# Patient Record
Sex: MALE | Race: WHITE | NOT HISPANIC OR LATINO | URBAN - METROPOLITAN AREA
[De-identification: names, ages, dates, MRNs, and addresses within clinical notes are randomized per-mention and may not be internally consistent; named-entity substitution may affect disease eponyms.]

---

## 2019-07-17 ENCOUNTER — HOSPITAL ENCOUNTER (EMERGENCY)
Facility: HOSPITAL | Age: 34
Discharge: HOME | End: 2019-07-17
Attending: EMERGENCY MEDICINE
Payer: MEDICAID

## 2019-07-17 VITALS
HEIGHT: 73 IN | DIASTOLIC BLOOD PRESSURE: 70 MMHG | SYSTOLIC BLOOD PRESSURE: 135 MMHG | TEMPERATURE: 97 F | HEART RATE: 76 BPM | RESPIRATION RATE: 16 BRPM | OXYGEN SATURATION: 100 %

## 2019-07-17 DIAGNOSIS — M54.50 CHRONIC MIDLINE LOW BACK PAIN WITHOUT SCIATICA: Primary | ICD-10-CM

## 2019-07-17 DIAGNOSIS — G89.29 CHRONIC MIDLINE LOW BACK PAIN WITHOUT SCIATICA: Primary | ICD-10-CM

## 2019-07-17 PROCEDURE — 99284 EMERGENCY DEPT VISIT MOD MDM: CPT

## 2019-07-17 RX ORDER — TRAMADOL HYDROCHLORIDE 50 MG/1
50 TABLET ORAL EVERY 6 HOURS PRN
Qty: 12 TABLET | Refills: 0 | Status: ON HOLD | OUTPATIENT
Start: 2019-07-17 | End: 2022-08-17 | Stop reason: HOSPADM

## 2019-07-17 RX ORDER — ORPHENADRINE CITRATE 100 MG/1
100 TABLET, EXTENDED RELEASE ORAL 2 TIMES DAILY
Qty: 12 TABLET | Refills: 0 | Status: ON HOLD | OUTPATIENT
Start: 2019-07-17 | End: 2022-08-17 | Stop reason: HOSPADM

## 2019-07-17 NOTE — ED PROVIDER NOTES
"   History      Chief Complaint   Patient presents with    Back Pain     pt complains of back pain for past 6 months. hx of "C3-C5 fracture in 2005."       Review of patient's allergies indicates:  No Known Allergies     HPI   HPI    7/17/2019, 10:32 AM   History obtained from the patient      History of Present Illness: Mario Echevarria is a 34 y.o. male patient who presents to the Emergency Department for low back pain for months. He just completed round of steroids last week.  He is taking ibuprofen.  Symptoms are constant and moderate in severity.  The patient describes the symptoms as achy.  Denies new injury,  bladder/bowel dysfunction, fever, saddle anesthesia, or focal weakness.   No further complaints or concerns at this time.           PCP: Primary Doctor No       Past Medical History:  History reviewed. No pertinent past medical history.      Past Surgical History:  No past surgical history on file.        Family History:  History reviewed. No pertinent family history.        Social History:  Social History     Tobacco Use    Smoking status: Not on file   Substance and Sexual Activity    Alcohol use: Not on file    Drug use: Not on file    Sexual activity: Not on file       ROS   Review of Systems   Constitutional: Negative for chills and fever.   HENT: Negative for sore throat.    Respiratory: Negative for shortness of breath.    Cardiovascular: Negative for chest pain.   Gastrointestinal: Negative for nausea and vomiting.   Genitourinary: Negative for decreased urine volume, difficulty urinating, dysuria and flank pain.   Musculoskeletal: Positive for back pain. Negative for neck stiffness.   Skin: Negative for rash and wound.   Neurological: Negative for weakness and numbness.   Hematological: Does not bruise/bleed easily.   All other systems reviewed and are negative.    Review of Systems    Physical Exam      Initial Vitals [07/17/19 1000]   BP Pulse Resp Temp SpO2   135/70 76 16 97.4 °F " "(36.3 °C) 100 %      MAP       --         Physical Exam  Vital signs and nursing notes reviewed.  Constitutional: Patient is in NAD. Awake and alert. Well-developed and well-nourished.  Head: Atraumatic. Normocephalic.  Eyes: PERRL. EOM intact. Conjunctivae nl. No scleral icterus.  ENT: Mucous membranes are moist. Oropharynx is clear.  Neck: Supple. No JVD. No lymphadenopathy.  No meningismus.  Nontender midline  Cardiovascular: Regular rate and rhythm. No murmurs, rubs, or gallops. Distal pulses are 2+ and symmetric.  Pulmonary/Chest: No respiratory distress. Clear to auscultation bilaterally. No wheezing, rales, or rhonchi.  Abdominal: Soft. Non-distended. No TTP. No rebound, guarding, or rigidity. Good bowel sounds.  Genitourinary: No CVA tenderness  Musculoskeletal: Moves all extremities. No edema.   Non tender c/t spine.  Lumbar spine with mild bilateral paraspinous ttp, no midline ttp or step.  Skin: Warm and dry.  Neurological: Awake and alert. No acute focal neurological deficits are appreciated.  5/5 x 4 strength.  Strong and equal hand , foot plantar and dorsiflexion.  Psychiatric: Normal affect. Good eye contact. Appropriate in content.      ED Course      Procedures  ED Vital Signs:  Vitals:    07/17/19 1000   BP: 135/70   Pulse: 76   Resp: 16   Temp: 97.4 °F (36.3 °C)   TempSrc: Oral   SpO2: 100%   Height: 6' 1" (1.854 m)                 Imaging Results:  Imaging Results    None            The Emergency Provider reviewed the vital signs and test results, which are outlined above.    ED Discussion         Medication(s) given in the ER:  Medications - No data to display        Follow-up Information     [facility] In 2 days.    Contact information:  [address] [facility] [address] [phone]                       Discharge Medication List as of 7/17/2019 10:32 AM      START taking these medications    Details   orphenadrine (NORFLEX) 100 mg tablet Take 1 tablet (100 mg " total) by mouth 2 (two) times daily., Starting Wed 7/17/2019, Print      traMADol (ULTRAM) 50 mg tablet Take 1 tablet (50 mg total) by mouth every 6 (six) hours as needed for Pain., Starting Wed 7/17/2019, Print                Medical Decision Making      Gave # to [facility] neurosurgery    All findings were reviewed with the patient/family in detail.   All remaining questions and concerns were addressed at that time.  Patient/family has been counseled regarding the need for follow-up as well as the indication to return to the emergency room should new or worrisome developments occur.          MDM               Clinical Impression:        ICD-10-CM ICD-9-CM   1. Chronic midline low back pain without sciatica M54.5 724.2    G89.29 338.29               Margie Ceballos PA-C  07/17/19 1213

## 2022-08-10 ENCOUNTER — HOSPITAL ENCOUNTER (EMERGENCY)
Facility: HOSPITAL | Age: 37
Discharge: PSYCHIATRIC OR SUBSTANCE USE FACILITY | End: 2022-08-10
Attending: EMERGENCY MEDICINE

## 2022-08-10 VITALS
HEIGHT: 72 IN | WEIGHT: 145.5 LBS | TEMPERATURE: 98 F | OXYGEN SATURATION: 98 % | BODY MASS INDEX: 19.71 KG/M2 | RESPIRATION RATE: 16 BRPM | SYSTOLIC BLOOD PRESSURE: 126 MMHG | HEART RATE: 93 BPM | DIASTOLIC BLOOD PRESSURE: 78 MMHG

## 2022-08-10 DIAGNOSIS — R45.851 DEPRESSION WITH SUICIDAL IDEATION: Primary | ICD-10-CM

## 2022-08-10 DIAGNOSIS — F32.A DEPRESSION WITH SUICIDAL IDEATION: Primary | ICD-10-CM

## 2022-08-10 DIAGNOSIS — R45.850 HOMICIDAL BEHAVIOR: ICD-10-CM

## 2022-08-10 LAB
ALBUMIN SERPL BCP-MCNC: 4.3 G/DL (ref 3.5–5.2)
ALP SERPL-CCNC: 74 U/L (ref 55–135)
ALT SERPL W/O P-5'-P-CCNC: 19 U/L (ref 10–44)
AMPHET+METHAMPHET UR QL: ABNORMAL
ANION GAP SERPL CALC-SCNC: 12 MMOL/L (ref 8–16)
APAP SERPL-MCNC: <3 UG/ML (ref 10–20)
AST SERPL-CCNC: 16 U/L (ref 10–40)
BARBITURATES UR QL SCN>200 NG/ML: NEGATIVE
BASOPHILS # BLD AUTO: 0.08 K/UL (ref 0–0.2)
BASOPHILS NFR BLD: 0.8 % (ref 0–1.9)
BENZODIAZ UR QL SCN>200 NG/ML: NEGATIVE
BILIRUB SERPL-MCNC: 0.9 MG/DL (ref 0.1–1)
BILIRUB UR QL STRIP: ABNORMAL
BUN SERPL-MCNC: 13 MG/DL (ref 6–20)
BZE UR QL SCN: NEGATIVE
CALCIUM SERPL-MCNC: 9.6 MG/DL (ref 8.7–10.5)
CANNABINOIDS UR QL SCN: NEGATIVE
CHLORIDE SERPL-SCNC: 105 MMOL/L (ref 95–110)
CLARITY UR: CLEAR
CO2 SERPL-SCNC: 23 MMOL/L (ref 23–29)
COLOR UR: YELLOW
CREAT SERPL-MCNC: 1.3 MG/DL (ref 0.5–1.4)
CREAT UR-MCNC: 288.4 MG/DL (ref 23–375)
DIFFERENTIAL METHOD: ABNORMAL
EOSINOPHIL # BLD AUTO: 0.2 K/UL (ref 0–0.5)
EOSINOPHIL NFR BLD: 1.8 % (ref 0–8)
ERYTHROCYTE [DISTWIDTH] IN BLOOD BY AUTOMATED COUNT: 11.7 % (ref 11.5–14.5)
EST. GFR  (NO RACE VARIABLE): >60 ML/MIN/1.73 M^2
ETHANOL SERPL-MCNC: <10 MG/DL
GLUCOSE SERPL-MCNC: 92 MG/DL (ref 70–110)
GLUCOSE UR QL STRIP: NEGATIVE
HCT VFR BLD AUTO: 41.2 % (ref 40–54)
HGB BLD-MCNC: 14.7 G/DL (ref 14–18)
HGB UR QL STRIP: NEGATIVE
IMM GRANULOCYTES # BLD AUTO: 0.02 K/UL (ref 0–0.04)
IMM GRANULOCYTES NFR BLD AUTO: 0.2 % (ref 0–0.5)
KETONES UR QL STRIP: NEGATIVE
LEUKOCYTE ESTERASE UR QL STRIP: NEGATIVE
LYMPHOCYTES # BLD AUTO: 1.1 K/UL (ref 1–4.8)
LYMPHOCYTES NFR BLD: 11.1 % (ref 18–48)
MCH RBC QN AUTO: 31.3 PG (ref 27–31)
MCHC RBC AUTO-ENTMCNC: 35.7 G/DL (ref 32–36)
MCV RBC AUTO: 88 FL (ref 82–98)
METHADONE UR QL SCN>300 NG/ML: NEGATIVE
MONOCYTES # BLD AUTO: 0.6 K/UL (ref 0.3–1)
MONOCYTES NFR BLD: 6.3 % (ref 4–15)
NEUTROPHILS # BLD AUTO: 7.8 K/UL (ref 1.8–7.7)
NEUTROPHILS NFR BLD: 79.8 % (ref 38–73)
NITRITE UR QL STRIP: NEGATIVE
NRBC BLD-RTO: 0 /100 WBC
OPIATES UR QL SCN: NEGATIVE
PCP UR QL SCN>25 NG/ML: NEGATIVE
PH UR STRIP: 7 [PH] (ref 5–8)
PLATELET # BLD AUTO: 322 K/UL (ref 150–450)
PMV BLD AUTO: 7.9 FL (ref 9.2–12.9)
POTASSIUM SERPL-SCNC: 4 MMOL/L (ref 3.5–5.1)
PROT SERPL-MCNC: 7.7 G/DL (ref 6–8.4)
PROT UR QL STRIP: NEGATIVE
RBC # BLD AUTO: 4.7 M/UL (ref 4.6–6.2)
SARS-COV-2 RDRP RESP QL NAA+PROBE: NEGATIVE
SODIUM SERPL-SCNC: 140 MMOL/L (ref 136–145)
SP GR UR STRIP: 1.02 (ref 1–1.03)
TOXICOLOGY INFORMATION: ABNORMAL
TSH SERPL DL<=0.005 MIU/L-ACNC: 0.82 UIU/ML (ref 0.4–4)
URN SPEC COLLECT METH UR: ABNORMAL
UROBILINOGEN UR STRIP-ACNC: NEGATIVE EU/DL
WBC # BLD AUTO: 9.83 K/UL (ref 3.9–12.7)

## 2022-08-10 PROCEDURE — 80143 DRUG ASSAY ACETAMINOPHEN: CPT | Performed by: EMERGENCY MEDICINE

## 2022-08-10 PROCEDURE — 86803 HEPATITIS C AB TEST: CPT | Performed by: EMERGENCY MEDICINE

## 2022-08-10 PROCEDURE — 87389 HIV-1 AG W/HIV-1&-2 AB AG IA: CPT | Performed by: EMERGENCY MEDICINE

## 2022-08-10 PROCEDURE — 84443 ASSAY THYROID STIM HORMONE: CPT | Performed by: EMERGENCY MEDICINE

## 2022-08-10 PROCEDURE — 96372 THER/PROPH/DIAG INJ SC/IM: CPT | Performed by: EMERGENCY MEDICINE

## 2022-08-10 PROCEDURE — 85025 COMPLETE CBC W/AUTO DIFF WBC: CPT | Performed by: EMERGENCY MEDICINE

## 2022-08-10 PROCEDURE — 63600175 PHARM REV CODE 636 W HCPCS: Performed by: EMERGENCY MEDICINE

## 2022-08-10 PROCEDURE — 36415 COLL VENOUS BLD VENIPUNCTURE: CPT | Performed by: EMERGENCY MEDICINE

## 2022-08-10 PROCEDURE — 99285 EMERGENCY DEPT VISIT HI MDM: CPT | Mod: 25

## 2022-08-10 PROCEDURE — 82077 ASSAY SPEC XCP UR&BREATH IA: CPT | Performed by: EMERGENCY MEDICINE

## 2022-08-10 PROCEDURE — U0002 COVID-19 LAB TEST NON-CDC: HCPCS | Performed by: EMERGENCY MEDICINE

## 2022-08-10 PROCEDURE — 80307 DRUG TEST PRSMV CHEM ANLYZR: CPT | Performed by: EMERGENCY MEDICINE

## 2022-08-10 PROCEDURE — 81003 URINALYSIS AUTO W/O SCOPE: CPT | Mod: 59 | Performed by: EMERGENCY MEDICINE

## 2022-08-10 PROCEDURE — 80053 COMPREHEN METABOLIC PANEL: CPT | Performed by: EMERGENCY MEDICINE

## 2022-08-10 RX ORDER — LORAZEPAM 2 MG/ML
2 INJECTION INTRAMUSCULAR
Status: COMPLETED | OUTPATIENT
Start: 2022-08-10 | End: 2022-08-10

## 2022-08-10 RX ORDER — LORAZEPAM 1 MG/1
2 TABLET ORAL
Status: DISCONTINUED | OUTPATIENT
Start: 2022-08-10 | End: 2022-08-10

## 2022-08-10 RX ADMIN — LORAZEPAM 2 MG: 2 INJECTION INTRAMUSCULAR; INTRAVENOUS at 11:08

## 2022-08-10 NOTE — ED PROVIDER NOTES
Encounter Date: 8/10/2022    SCRIBE #1 NOTE: I, Breaan Wagneremeli, am scribing for, and in the presence of, Jamison Vang MD.       History     Chief Complaint   Patient presents with    Suicidal     Brought in by [place] [employer].  Threat of suicide by .      Time seen by provider: 10:55 AM on 08/10/2022    Mario Echevarria is a 37 y.o. male who presents to the ED via [place] police after threat of suicide. Per [place] police, they received a call from [facility] police stating they had a person threatening suicide by  ad they pinged his phone to [place]. Police report he has a warrant out for aggravated firearm possession. [place] police reports they pinged his phone to the [employer] parking lot. Police states they spent 2 hours on the phone negotiating with him to exit his vehicle but they eventually had to have SWAT team break into the vehicle. Patient states he does not want to be alive. He reports he has money and family problems. He states his three kids want nothing to do with him because he chose his current girlfriend over them. He states he has only seen them 3 times in the last 2.5 years. Patient reports he has a hx of depression and was hospitalized for suicide attempt by swallowing Xanax pills in 2020. Patient reports he snorted methamphetamines yesterday. Patient denies auditory hallucinations.      The history is provided by the patient and the police.     Review of patient's allergies indicates:  No Known Allergies  No past medical history on file.  No past surgical history on file.  No family history on file.     Review of Systems   Constitutional: Negative for fever.   HENT: Negative for sore throat.    Respiratory: Negative for shortness of breath.    Cardiovascular: Negative for chest pain.   Gastrointestinal: Negative for vomiting.   Genitourinary: Negative for dysuria.   Musculoskeletal: Negative for gait problem.   Neurological: Negative for weakness.   Psychiatric/Behavioral:  Positive for suicidal ideas. Negative for hallucinations.       Physical Exam     Initial Vitals [08/10/22 1040]   BP Pulse Resp Temp SpO2   (!) 186/97 93 (!) 24 98.4 °F (36.9 °C) 97 %      MAP       --         Physical Exam    Nursing note and vitals reviewed.  Constitutional: He appears well-developed and well-nourished. He is not diaphoretic. No distress.   HENT:   Head: Normocephalic and atraumatic.   Mouth/Throat: Oropharynx is clear and moist. No oropharyngeal exudate.   Eyes: Pupils are equal, round, and reactive to light. Right conjunctiva is injected. Left conjunctiva is injected.   Neck: Neck supple.   Normal range of motion.  Cardiovascular: Normal rate, regular rhythm, normal heart sounds and intact distal pulses. Exam reveals no gallop and no friction rub.    No murmur heard.  Pulmonary/Chest: Breath sounds normal. No respiratory distress. He has no wheezes. He has no rhonchi. He has no rales.   Musculoskeletal:         General: No tenderness or edema. Normal range of motion.      Cervical back: Normal range of motion and neck supple.     Lymphadenopathy:     He has no cervical adenopathy.   Neurological: He is alert and oriented to person, place, and time. He has normal strength. GCS score is 15. GCS eye subscore is 4. GCS verbal subscore is 5. GCS motor subscore is 6.   Skin: Skin is warm and dry. Capillary refill takes less than 2 seconds.   Psychiatric: He expresses suicidal ideation. He expresses suicidal plans.   Emotionally distressed. Anxious. Tearful. Depressed. Denies hallucinations. Poor insight to disease state. Psychomotor gait.         ED Course   Procedures  Labs Reviewed   CBC W/ AUTO DIFFERENTIAL - Abnormal; Notable for the following components:       Result Value    MCH 31.3 (*)     MPV 7.9 (*)     Gran # (ANC) 7.8 (*)     Gran % 79.8 (*)     Lymph % 11.1 (*)     All other components within normal limits   URINALYSIS, REFLEX TO URINE CULTURE - Abnormal; Notable for the following  components:    Bilirubin (UA) 1+ (*)     All other components within normal limits    Narrative:     Specimen Source->Urine   DRUG SCREEN PANEL, URINE EMERGENCY - Abnormal; Notable for the following components:    Amphetamine Screen, Ur Presumptive Positive (*)     All other components within normal limits    Narrative:     Specimen Source->Urine   ACETAMINOPHEN LEVEL - Abnormal; Notable for the following components:    Acetaminophen (Tylenol), Serum <3.0 (*)     All other components within normal limits   COMPREHENSIVE METABOLIC PANEL   TSH   ALCOHOL,MEDICAL (ETHANOL)   SARS-COV-2 RNA AMPLIFICATION, QUAL   HIV 1 / 2 ANTIBODY   HEPATITIS C ANTIBODY          Imaging Results    None          Medications   lorazepam injection 2 mg (2 mg Intramuscular Given 8/10/22 1130)     Medical Decision Making:   History:   Old Medical Records: I decided to obtain old medical records.  Clinical Tests:   Lab Tests: Ordered and Reviewed          Scribe Attestation:   Scribe #1: I performed the above scribed service and the documentation accurately describes the services I performed. I attest to the accuracy of the note.        ED Course as of 08/10/22 1821   Wed Aug 10, 2022   1113 I, Dr. Jamison Vang, personally performed the services described in this documentation.   All medical record entries made by the scribe were at my direction and in my presence.   I have reviewed the chart and agree that the record is accurate and complete.   Jamison Vang MD.    [NP]   1113 This is a 37 y.o.male patient with presentation of suicidal ideation.  Pt has history of psychiatric illness with previous psychiatric admission for suicidal ideation and attempt via overdose. Pt is currently not on psychiatric medication. Patient denies homicidal ideation, admits to suicidal ideation.  Patient depressed about family situation, estranged from children and current girlfriend does not want anything to do with his kids.      Plan for medical clearance labs,  EKG, PEC, one-to-one observation. [NP]   1428 Resting comfortably, easily arousable.  Awaiting urine studies [NP]   1712 SARS-CoV-2 RNA, Amplification, Qual: Negative [NP]      ED Course User Index  [NP] Jamison Vang MD          Discussed with [place] , Freddie Barrientos, who states that police were going after the patient because he had put a gun to his girlfriend's head and stated that he was going to skin her alive in front of her kids.  He is concerned that patient may act out in violence potentially providing deadly harm to his girlfriend if he were to be released.  I explained to the officer that patient is currently PEC'd at this time and will not be released Hospital but we will inform him when patient is transferred to behavior health facility.    Medically cleared for psychiatry placement: 8/10/2022  6:21 PM        Clinical Impression:   Final diagnoses:  [F32.A, R45.851] Depression with suicidal ideation (Primary)  [R45.850] Homicidal behavior          ED Disposition Condition    Transfer to Psych Facility         ED Prescriptions     None        Follow-up Information    None          Jamison Vang MD  08/10/22 4100

## 2022-08-10 NOTE — ED NOTES
"Per [place] [facility] the patient told his brother he was going to commit suicide by , patient was in the [employer] parking Lot and it took more than an hour for [place] [facility] to apprehend the patient to bring him to the hospital for treatment, patient would not answer any questions for me regarding suicide, stated "the  is going to hurt me" patient was tearful during triage and was scared to move his hands from behind his back once his cuffs were removed.   "

## 2022-08-11 PROBLEM — F19.10 SUBSTANCE ABUSE: Status: ACTIVE | Noted: 2022-08-11

## 2022-08-11 PROBLEM — F32.A DEPRESSION: Status: ACTIVE | Noted: 2022-08-11

## 2022-08-11 PROBLEM — Z13.9 ENCOUNTER FOR MEDICAL SCREENING EXAMINATION: Status: ACTIVE | Noted: 2022-08-11

## 2022-08-11 LAB
HCV AB SERPL QL IA: NEGATIVE
HIV 1+2 AB+HIV1 P24 AG SERPL QL IA: NEGATIVE

## 2022-11-14 PROBLEM — Z13.9 ENCOUNTER FOR MEDICAL SCREENING EXAMINATION: Status: RESOLVED | Noted: 2022-08-11 | Resolved: 2022-11-14
